# Patient Record
Sex: MALE | Race: WHITE | Employment: OTHER | URBAN - METROPOLITAN AREA
[De-identification: names, ages, dates, MRNs, and addresses within clinical notes are randomized per-mention and may not be internally consistent; named-entity substitution may affect disease eponyms.]

---

## 2017-02-17 PROBLEM — M15.9 PRIMARY OSTEOARTHRITIS INVOLVING MULTIPLE JOINTS: Chronic | Status: ACTIVE | Noted: 2017-02-17

## 2017-02-17 PROBLEM — I95.1 ORTHOSTATIC SYNCOPE: Status: ACTIVE | Noted: 2017-02-17

## 2017-05-25 PROBLEM — R79.89 ABNORMAL LIVER FUNCTION TESTS: Status: ACTIVE | Noted: 2017-05-25

## 2017-10-10 PROBLEM — R25.1 TREMOR OF LEFT HAND: Status: ACTIVE | Noted: 2017-10-10

## 2017-10-10 PROBLEM — G25.9 EXTRAPYRAMIDAL SYNDROME: Status: ACTIVE | Noted: 2017-10-10

## 2017-12-12 PROBLEM — F31.76 BIPOLAR DISORDER, IN FULL REMISSION, MOST RECENT EPISODE DEPRESSED (HCC): Chronic | Status: ACTIVE | Noted: 2017-12-12

## 2018-01-25 ENCOUNTER — HOSPITAL ENCOUNTER (OUTPATIENT)
Dept: LAB | Age: 68
Discharge: HOME | End: 2018-01-25

## 2018-01-25 PROCEDURE — 88312 SPECIAL STAINS GROUP 1: CPT | Performed by: INTERNAL MEDICINE

## 2018-01-25 PROCEDURE — 88305 TISSUE EXAM BY PATHOLOGIST: CPT | Performed by: INTERNAL MEDICINE

## 2018-01-31 PROBLEM — I95.1 SYNCOPE DUE TO ORTHOSTATIC HYPOTENSION: Status: ACTIVE | Noted: 2018-01-31

## 2018-01-31 PROBLEM — R79.89 ABNORMAL LIVER FUNCTION TESTS: Status: RESOLVED | Noted: 2017-05-25 | Resolved: 2018-01-31

## 2018-02-23 ENCOUNTER — HOSPITAL ENCOUNTER (OUTPATIENT)
Dept: GENERAL RADIOLOGY | Age: 68
Discharge: HOME | End: 2018-02-23
Payer: MEDICARE

## 2018-02-23 DIAGNOSIS — R05.8 PRODUCTIVE COUGH: ICD-10-CM

## 2018-02-23 PROCEDURE — 71046 X-RAY EXAM CHEST 2 VIEWS: CPT

## 2018-04-04 PROBLEM — K63.5 HYPERPLASTIC COLONIC POLYP: Chronic | Status: ACTIVE | Noted: 2018-04-04

## 2018-04-04 PROBLEM — I95.1 SYNCOPE DUE TO ORTHOSTATIC HYPOTENSION: Status: RESOLVED | Noted: 2018-01-31 | Resolved: 2018-04-04

## 2018-04-04 PROBLEM — M25.511 RIGHT SHOULDER PAIN: Status: ACTIVE | Noted: 2018-04-04

## 2018-06-05 PROBLEM — G21.11 NEUROLEPTIC-INDUCED PARKINSONISM (HCC): Status: ACTIVE | Noted: 2018-06-05

## 2018-07-17 PROBLEM — G21.11 NEUROLEPTIC-INDUCED PARKINSONISM (HCC): Chronic | Status: ACTIVE | Noted: 2018-06-05

## 2019-02-10 PROBLEM — R25.1 TREMOR OF LEFT HAND: Status: RESOLVED | Noted: 2017-10-10 | Resolved: 2019-02-10

## 2019-02-10 PROBLEM — G25.9 EXTRAPYRAMIDAL SYNDROME: Status: RESOLVED | Noted: 2017-10-10 | Resolved: 2019-02-10

## 2019-02-10 PROBLEM — G21.11 NEUROLEPTIC-INDUCED PARKINSONISM (HCC): Chronic | Status: RESOLVED | Noted: 2018-06-05 | Resolved: 2019-02-10

## 2020-08-10 ENCOUNTER — HOSPITAL ENCOUNTER (OUTPATIENT)
Dept: CT IMAGING | Age: 70
Discharge: HOME | End: 2020-08-10
Attending: INTERNAL MEDICINE

## 2020-08-10 DIAGNOSIS — R55 SYNCOPE, UNSPECIFIED SYNCOPE TYPE: ICD-10-CM

## 2020-08-10 DIAGNOSIS — S09.90XA CLOSED HEAD INJURY, INITIAL ENCOUNTER: ICD-10-CM

## 2021-02-11 ENCOUNTER — HOSPITAL ENCOUNTER (OUTPATIENT)
Dept: GENERAL RADIOLOGY | Age: 71
Discharge: HOME | End: 2021-02-11

## 2022-02-03 ENCOUNTER — TELEPHONE (OUTPATIENT)
Dept: INTERNAL MEDICINE CLINIC | Age: 72
End: 2022-02-03

## 2022-02-03 NOTE — TELEPHONE ENCOUNTER
----- Message from Georgia Posada sent at 2/3/2022 12:06 PM EST -----  Subject: Refill Request    QUESTIONS  Name of Medication? simvastatin (ZOCOR) 40 mg tablet  Patient-reported dosage and instructions? Patient unknown  How many days do you have left? 3  Preferred Pharmacy? [facility] phone number (if available)? [phone]  Additional Information for Provider? Patient is going out of town an is in   need of this medicine   ---------------------------------------------------------------------------  --------------  [address]  What is the best way for the office to contact you? OK to leave message on   voicemail  Preferred Call Back Phone Number?  [phone]

## 2022-03-18 PROBLEM — M25.511 RIGHT SHOULDER PAIN: Status: ACTIVE | Noted: 2018-04-04

## 2022-03-19 PROBLEM — F31.76 BIPOLAR DISORDER, IN FULL REMISSION, MOST RECENT EPISODE DEPRESSED (HCC): Status: ACTIVE | Noted: 2017-12-12

## 2022-03-19 PROBLEM — M15.9 PRIMARY OSTEOARTHRITIS INVOLVING MULTIPLE JOINTS: Status: ACTIVE | Noted: 2017-02-17

## 2022-03-20 PROBLEM — K63.5 HYPERPLASTIC COLONIC POLYP: Status: ACTIVE | Noted: 2018-04-04

## 2023-01-26 ENCOUNTER — TRANSCRIBE ORDERS (OUTPATIENT)
Dept: SCHEDULING | Age: 73
End: 2023-01-26

## 2023-01-26 ENCOUNTER — HOSPITAL ENCOUNTER (OUTPATIENT)
Dept: CT IMAGING | Age: 73
Discharge: HOME | End: 2023-01-26
Payer: MEDICARE

## 2023-01-26 DIAGNOSIS — Z53.9 PROCEDURE AND TREATMENT NOT CARRIED OUT, UNSPECIFIED REASON: Primary | ICD-10-CM

## 2023-01-26 DIAGNOSIS — Z53.9 PROCEDURE AND TREATMENT NOT CARRIED OUT, UNSPECIFIED REASON: ICD-10-CM

## 2023-01-26 PROCEDURE — 74261 CT COLONOGRAPHY DX: CPT
